# Patient Record
Sex: FEMALE | Race: ASIAN | Employment: STUDENT | URBAN - METROPOLITAN AREA
[De-identification: names, ages, dates, MRNs, and addresses within clinical notes are randomized per-mention and may not be internally consistent; named-entity substitution may affect disease eponyms.]

---

## 2024-01-10 ENCOUNTER — OFFICE VISIT (OUTPATIENT)
Age: 29
End: 2024-01-10

## 2024-01-10 VITALS
TEMPERATURE: 98.9 F | SYSTOLIC BLOOD PRESSURE: 106 MMHG | HEIGHT: 66 IN | RESPIRATION RATE: 18 BRPM | BODY MASS INDEX: 29.6 KG/M2 | HEART RATE: 82 BPM | OXYGEN SATURATION: 95 % | WEIGHT: 184.2 LBS | DIASTOLIC BLOOD PRESSURE: 74 MMHG

## 2024-01-10 DIAGNOSIS — R05.3 PERSISTENT COUGH: Primary | ICD-10-CM

## 2024-01-10 DIAGNOSIS — R09.81 NASAL SINUS CONGESTION: ICD-10-CM

## 2024-01-10 RX ORDER — FLUTICASONE PROPIONATE 50 MCG
2 SPRAY, SUSPENSION (ML) NASAL DAILY
Qty: 16 G | Refills: 0 | COMMUNITY
Start: 2024-01-10

## 2024-01-10 RX ORDER — PSEUDOEPHEDRINE HCL 30 MG
30 TABLET ORAL EVERY 4 HOURS PRN
Refills: 1 | COMMUNITY
Start: 2024-01-10 | End: 2024-01-17

## 2024-01-10 RX ORDER — FLUOXETINE 10 MG/1
10 CAPSULE ORAL DAILY
COMMUNITY

## 2024-01-10 NOTE — PROGRESS NOTES
findings to suggest a bacterial component. Recommendation to treat fever, with appropriate antipyretics along with adequate oral hydration, and rest were reviewed.  I reviewed acute symptoms, usually begin to resolve in 72 hours, though milder symptoms, particularly runny nose, cough, fatigue may last 7 to 10 days or longer and treatment is aimed at managing the symptoms.                Follow up in 4 to 5 days if symptoms persist or if symptoms worsen.    SUBJECTIVE/OBJECTIVE:  HPI     28 y.o. female presents with symptoms of productive cough.  Patient states she had a cold prior to Christmas that seemed to improve.  She has no history of asthma or smoking.  She then went to [facility] on vacation and returned yesterday.  She states during that time she again developed a dry, itchy throat, a cough with phlegm production that eventually became more dry.  She returned from [facility] yesterday and tells me that multiple other people in her [place] have also had similar symptoms although she has been sick much longer. She did do a COVID test on December 30 and again today.  Both were negative.  Patient states she is concerned because she has a history of acute bronchitis.  She has no known exposure to any one that has tested positive for flu, COVID or RSV.         Vitals:    01/10/24 1635   BP: 106/74   Site: Right Upper Arm   Position: Sitting   Cuff Size: Medium Adult   Pulse: 82   Resp: 18   Temp: 98.9 °F (37.2 °C)   TempSrc: Oral   SpO2: 95%   Weight: 83.6 kg (184 lb 3.2 oz)   Height: 1.676 m (5' 6\")       No results found for this visit on 01/10/24.     No past medical history on file.    No past surgical history on file.    No family history on file.    No Known Allergies    Social History     Tobacco Use    Smoking status: Never    Smokeless tobacco: Never   Substance Use Topics    Alcohol use: Never    Drug use: Never        Physical Exam  Constitutional:       General: She is not in acute distress.     Appearance:

## 2024-01-10 NOTE — PATIENT INSTRUCTIONS
You most likely have a viral illness. Treatment of the symptoms is important and makes you feel better. Use Tylenol or Advil for body aches and fever or headache. Flonase helps open the nasal passages and allow the fluid to drain. Sudafed can help dry up secretions in the sinuses. Mucinex can thin the lung secretions.     We will call you with the Chest xray results.     I have discussed any testing results, diagnosis and treatment plan. If symptoms worsen please present to your local ED for urgent matters. Otherwise, please follow up with your PCP. Thank you for seeing us today at [facility]. I I hope you feel better soon.